# Patient Record
Sex: FEMALE | Race: WHITE | Employment: FULL TIME | URBAN - METROPOLITAN AREA
[De-identification: names, ages, dates, MRNs, and addresses within clinical notes are randomized per-mention and may not be internally consistent; named-entity substitution may affect disease eponyms.]

---

## 2017-07-18 ENCOUNTER — OFFICE VISIT (OUTPATIENT)
Dept: INTERNAL MEDICINE CLINIC | Age: 62
End: 2017-07-18

## 2017-07-18 VITALS
BODY MASS INDEX: 29.47 KG/M2 | WEIGHT: 199 LBS | SYSTOLIC BLOOD PRESSURE: 118 MMHG | HEART RATE: 63 BPM | HEIGHT: 69 IN | DIASTOLIC BLOOD PRESSURE: 88 MMHG | RESPIRATION RATE: 14 BRPM | TEMPERATURE: 98.6 F | OXYGEN SATURATION: 99 %

## 2017-07-18 DIAGNOSIS — G89.29 GROIN PAIN, CHRONIC, RIGHT: Primary | ICD-10-CM

## 2017-07-18 DIAGNOSIS — Z12.11 COLON CANCER SCREENING: ICD-10-CM

## 2017-07-18 DIAGNOSIS — R10.31 GROIN PAIN, CHRONIC, RIGHT: Primary | ICD-10-CM

## 2017-07-18 DIAGNOSIS — Z00.00 GENERAL MEDICAL EXAM: ICD-10-CM

## 2017-07-18 DIAGNOSIS — Z12.39 BREAST SCREENING: ICD-10-CM

## 2017-07-18 DIAGNOSIS — Z01.419 WOMEN'S ANNUAL ROUTINE GYNECOLOGICAL EXAMINATION: ICD-10-CM

## 2017-07-18 DIAGNOSIS — Z95.828 S/P IVC FILTER: ICD-10-CM

## 2017-07-18 PROBLEM — Z86.711 HISTORY OF PULMONARY EMBOLUS (PE): Status: ACTIVE | Noted: 2017-07-18

## 2017-07-18 RX ORDER — BISMUTH SUBSALICYLATE 262 MG
1 TABLET,CHEWABLE ORAL DAILY
COMMUNITY

## 2017-07-18 NOTE — PROGRESS NOTES
Chief Complaint   Patient presents with    Establish Care     possible hernia, lab work, IV      Subjective: This is a new patient. Ms. Carmita Walton is a 63year old white female, going to turn 63 later this year, who has no real active health problems. She only takes a multivitamin daily. She has been on a Nutri-System type diet since March and has lost 40 pounds and is feeling well. She does complain of some right groin pain, which has been bothering her on and off for two years. She said it initially happened when she moved a lot of heavy boxes. She had pain in that area. The pain has continued, although much less, and it only hurts now when she lifts something heavy or does a lot of heavy bending. 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 She lives alone, has spent a lot of time taking care of her elderly mother over the years. Has not had any good healthcare. In 2005 she broke an ankle and due to immobility got a DVT and PE. She had an IVC filter, took Heparin and Coumadin for six months to a year. This was all in [place]. At that time she was commuting to [place] for work. She has an IVC filter in, they never took it out. She has not had a mammogram in recent years, no Pap smears. Went through routine menopause around age 50. She's never had a colonoscopy. She's not had recent lab. She has no other complaints. Social History:  No tobacco, no alcohol. Not , not sexually active. ROS:  Positive for some kind of a seizure disorder years ago. Maybe someone told her her gallbladder was bothering her. She gets some allergy symptoms, occasionally gets problems controlling her bladder. Her cancer history by family is lung cancer. There is some diabetes in the family. There is some heart disease in the family and hypertension. Physical Examination:    GENERAL:  She is a pretty healthy appearing white female in NAD.   VITALS:  Normal.  Blood pressure was normal.  HEENT:  Normal.  CHEST:  Lungs clear. CV:  S1, S2 regular. No murmur. ABDOMEN:  Soft. :  No tenderness in the groin, no hernia palpated. She stood, when she coughed or bent she had no discomfort. MUSCULOSKELETAL:  ROM of both hips were normal.  SLR test negative. Impression:  1. Good general health. 2. Groin pain is musculoskeletal, not too serious, not too worried about it. Plan:  1. Patient will need GYN care and I have referred her to Dr. Susanna Carter. 2. Patient is advised to get a colonoscopy, see if she can get it done this summer. I have given her the name of Dr. Nellie Horton to schedule that. 3. I asked her to do complete lab work today and that is a good idea. Basically healthy. 4. Told her the IVC filters can be problematic, generally they are not taken out, an old one from 2005. It doesn't sound like she is having issues with it at this point. Patient Active Problem List    Diagnosis    S/P IVC filter     2005        History of pulmonary embolus (PE)     Vitals:    07/18/17 1037   BP: 118/88   Pulse: 63   Resp: 14   Temp: 98.6 °F (37 °C)   TempSrc: Oral   SpO2: 99%   Weight: 199 lb (90.3 kg)   Height: 5' 9\" (1.753 m)     Body mass index is 29.39 kg/(m^2). DOING VERY WELL IN GOOD HEALTH, ADVISE CONTINUED HEALTHY LIFESTYLE  CONTINUE SCREENING TESTING AS ADVISED  Bib Vance was seen today for establish care. Diagnoses and all orders for this visit:    General medical exam  -     HELLEN MAMMO BI SCREENING INCL CAD;  Future  -     CBC WITH AUTOMATED DIFF  -     LIPID PANEL  -     METABOLIC PANEL, COMPREHENSIVE    S/P IVC filter    Women's annual routine gynecological examination  -     REFERRAL TO OBSTETRICS AND GYNECOLOGY    Colon cancer screening  -     REFERRAL TO GASTROENTEROLOGY    Breast screening    Groin pain, chronic, right

## 2017-07-19 LAB
ALBUMIN SERPL-MCNC: 4.2 G/DL (ref 3.5–5.5)
ALBUMIN/GLOB SERPL: 1.6 {RATIO} (ref 1.2–2.2)
ALP SERPL-CCNC: 92 IU/L (ref 39–117)
ALT SERPL-CCNC: 14 IU/L (ref 0–32)
AST SERPL-CCNC: 20 IU/L (ref 0–40)
BASOPHILS # BLD AUTO: 0 X10E3/UL (ref 0–0.2)
BASOPHILS NFR BLD AUTO: 1 %
BILIRUB SERPL-MCNC: 0.6 MG/DL (ref 0–1.2)
BUN SERPL-MCNC: 20 MG/DL (ref 6–24)
BUN/CREAT SERPL: 28 (ref 9–23)
CALCIUM SERPL-MCNC: 9.5 MG/DL (ref 8.7–10.2)
CHLORIDE SERPL-SCNC: 101 MMOL/L (ref 96–106)
CHOLEST SERPL-MCNC: 166 MG/DL (ref 100–199)
CO2 SERPL-SCNC: 24 MMOL/L (ref 18–29)
CREAT SERPL-MCNC: 0.72 MG/DL (ref 0.57–1)
EOSINOPHIL # BLD AUTO: 0.4 X10E3/UL (ref 0–0.4)
EOSINOPHIL NFR BLD AUTO: 7 %
ERYTHROCYTE [DISTWIDTH] IN BLOOD BY AUTOMATED COUNT: 13.9 % (ref 12.3–15.4)
GLOBULIN SER CALC-MCNC: 2.6 G/DL (ref 1.5–4.5)
GLUCOSE SERPL-MCNC: 88 MG/DL (ref 65–99)
HCT VFR BLD AUTO: 41.9 % (ref 34–46.6)
HDLC SERPL-MCNC: 44 MG/DL
HGB BLD-MCNC: 13.8 G/DL (ref 11.1–15.9)
IMM GRANULOCYTES # BLD: 0 X10E3/UL (ref 0–0.1)
IMM GRANULOCYTES NFR BLD: 0 %
INTERPRETATION, 910389: NORMAL
LDLC SERPL CALC-MCNC: 107 MG/DL (ref 0–99)
LYMPHOCYTES # BLD AUTO: 1.4 X10E3/UL (ref 0.7–3.1)
LYMPHOCYTES NFR BLD AUTO: 25 %
MCH RBC QN AUTO: 30.5 PG (ref 26.6–33)
MCHC RBC AUTO-ENTMCNC: 32.9 G/DL (ref 31.5–35.7)
MCV RBC AUTO: 93 FL (ref 79–97)
MONOCYTES # BLD AUTO: 0.4 X10E3/UL (ref 0.1–0.9)
MONOCYTES NFR BLD AUTO: 6 %
NEUTROPHILS # BLD AUTO: 3.4 X10E3/UL (ref 1.4–7)
NEUTROPHILS NFR BLD AUTO: 61 %
PLATELET # BLD AUTO: 173 X10E3/UL (ref 150–379)
POTASSIUM SERPL-SCNC: 4.3 MMOL/L (ref 3.5–5.2)
PROT SERPL-MCNC: 6.8 G/DL (ref 6–8.5)
RBC # BLD AUTO: 4.53 X10E6/UL (ref 3.77–5.28)
SODIUM SERPL-SCNC: 144 MMOL/L (ref 134–144)
TRIGL SERPL-MCNC: 77 MG/DL (ref 0–149)
VLDLC SERPL CALC-MCNC: 15 MG/DL (ref 5–40)
WBC # BLD AUTO: 5.5 X10E3/UL (ref 3.4–10.8)

## 2017-07-21 ENCOUNTER — HOSPITAL ENCOUNTER (OUTPATIENT)
Dept: MAMMOGRAPHY | Age: 62
Discharge: HOME | End: 2017-07-21
Attending: INTERNAL MEDICINE
Payer: COMMERCIAL

## 2017-07-21 DIAGNOSIS — Z00.00 GENERAL MEDICAL EXAM: ICD-10-CM

## 2017-07-21 PROCEDURE — 77067 SCR MAMMO BI INCL CAD: CPT

## 2017-07-26 ENCOUNTER — HOSPITAL ENCOUNTER (OUTPATIENT)
Dept: ULTRASOUND IMAGING | Age: 62
Discharge: HOME | End: 2017-07-26
Attending: INTERNAL MEDICINE
Payer: COMMERCIAL

## 2017-07-26 ENCOUNTER — HOSPITAL ENCOUNTER (OUTPATIENT)
Dept: MAMMOGRAPHY | Age: 62
Discharge: HOME | End: 2017-07-26
Attending: INTERNAL MEDICINE
Payer: COMMERCIAL

## 2017-07-26 DIAGNOSIS — R92.8 ABNORMAL MAMMOGRAM: ICD-10-CM

## 2017-07-26 DIAGNOSIS — R92.8 ABNORMAL MAMMOGRAM: Primary | ICD-10-CM

## 2017-07-26 PROCEDURE — 76642 ULTRASOUND BREAST LIMITED: CPT

## 2017-07-27 ENCOUNTER — OFFICE VISIT (OUTPATIENT)
Dept: OBGYN CLINIC | Age: 62
End: 2017-07-27

## 2017-07-27 VITALS
BODY MASS INDEX: 29.18 KG/M2 | HEART RATE: 73 BPM | WEIGHT: 197 LBS | HEIGHT: 69 IN | SYSTOLIC BLOOD PRESSURE: 115 MMHG | DIASTOLIC BLOOD PRESSURE: 75 MMHG

## 2017-07-27 DIAGNOSIS — Z91.89 DES EXPOSURE IN UTERO: ICD-10-CM

## 2017-07-27 DIAGNOSIS — Z01.419 ENCOUNTER FOR WELL WOMAN EXAM WITH ROUTINE GYNECOLOGICAL EXAM: Primary | ICD-10-CM

## 2017-07-27 DIAGNOSIS — Z86.711 HISTORY OF PULMONARY EMBOLUS (PE): ICD-10-CM

## 2017-07-27 NOTE — PATIENT INSTRUCTIONS
Well Visit, Women 48 to 72: Care Instructions  Your Care Instructions  Physical exams can help you stay healthy. Your doctor has checked your overall health and may have suggested ways to take good care of yourself. He or she also may have recommended tests. At home, you can help prevent illness with healthy eating, regular exercise, and other steps. Follow-up care is a key part of your treatment and safety. Be sure to make and go to all appointments, and call your doctor if you are having problems. It's also a good idea to know your test results and keep a list of the medicines you take. How can you care for yourself at home? · Reach and stay at a healthy weight. This will lower your risk for many problems, such as obesity, diabetes, heart disease, and high blood pressure. · Get at least 30 minutes of exercise on most days of the week. Walking is a good choice. You also may want to do other activities, such as running, swimming, cycling, or playing tennis or team sports. · Do not smoke. Smoking can make health problems worse. If you need help quitting, talk to your doctor about stop-smoking programs and medicines. These can increase your chances of quitting for good. · Protect your skin from too much sun. When you're outdoors from 10 a.m. to 4 p.m., stay in the shade or cover up with clothing and a hat with a wide brim. Wear sunglasses that block UV rays. Even when it's cloudy, put broad-spectrum sunscreen (SPF 30 or higher) on any exposed skin. · See a dentist one or two times a year for checkups and to have your teeth cleaned. · Wear a seat belt in the car. · Limit alcohol to 1 drink a day. Too much alcohol can cause health problems. Follow your doctor's advice about when to have certain tests. These tests can spot problems early. · Cholesterol.  Your doctor will tell you how often to have this done based on your age, family history, or other things that can increase your risk for heart attack and stroke. · Blood pressure. Have your blood pressure checked during a routine doctor visit. Your doctor will tell you how often to check your blood pressure based on your age, your blood pressure results, and other factors. · Mammogram. Ask your doctor how often you should have a mammogram, which is an X-ray of your breasts. A mammogram can spot breast cancer before it can be felt and when it is easiest to treat. · Pap test and pelvic exam. Ask your doctor how often you should have a Pap test. You may not need to have a Pap test as often as you used to. · Vision. Have your eyes checked every year or two or as often as your doctor suggests. Some experts recommend that you have yearly exams for glaucoma and other age-related eye problems starting at age 48. · Hearing. Tell your doctor if you notice any change in your hearing. You can have tests to find out how well you hear. · Diabetes. Ask your doctor whether you should have tests for diabetes. · Colon cancer. You should begin tests for colon cancer at age 48. You may have one of several tests. Your doctor will tell you how often to have tests based on your age and risk. Risks include whether you already had a precancerous polyp removed from your colon or whether your parents, sisters and brothers, or children have had colon cancer. · Thyroid disease. Talk to your doctor about whether to have your thyroid checked as part of a regular physical exam. Women have an increased chance of a thyroid problem. · Osteoporosis. You should begin tests for bone density at age 72. If you are younger than 72, ask your doctor whether you have factors that may increase your risk for this disease. You may want to have this test before age 72. · Heart attack and stroke risk. At least every 4 to 6 years, you should have your risk for heart attack and stroke assessed.  Your doctor uses factors such as your age, blood pressure, cholesterol, and whether you smoke or have diabetes to show what your risk for a heart attack or stroke is over the next 10 years. When should you call for help? Watch closely for changes in your health, and be sure to contact your doctor if you have any problems or symptoms that concern you. Where can you learn more? Go to http://sachin-brenda.info/. Enter Z518 in the search box to learn more about \"Well Visit, Women 50 to 72: Care Instructions. \"  Current as of: July 19, 2016  Content Version: 11.3  © 9736-8141 Healthwise, Incorporated. Care instructions adapted under license by "[employer] (which disclaims liability or warranty for this information). If you have questions about a medical condition or this instruction, always ask your healthcare professional. [address] any warranty or liability for your use of this information.

## 2017-07-27 NOTE — PROGRESS NOTES
Annual exam ages 40-58   (New Patient)     Erin Baldwin is a ,  63 y.o. female [facility] No LMP recorded. Patient is postmenopausal., who presents for her annual checkup. Since her last annual GYN exam about three or more years ago,  she has the following changes in her health history:  - abnl mammogram, has bx scheduled  at [facility]  - has colonoscopy scheduled for     ADDITIONAL CONCERNS:  She is having no significant problems. With regard to the Gardasil vaccine, she is older than the age for which it is FDA approved. Menstrual status:    Has gone through menopause. Contraception:    The current method of family planning is abstinence and post menopausal status. Sexual history:     reports that she does not currently engage in sexual activity but has had male partners.;        Pap and Mammogram History:    Her most recent Pap smear was normal per pt., obtained year(s) ago, unknown date. The patient had a recent mammogram on 17 -> Add't views -> 17 US recomm bx -> Appt set for 17. [facility]: Paternal Aunt + Br CA in her 45s. Osteoporosis History:    Family history does not include a first or second degree relative with osteopenia or osteoporosis. 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 Has IVC filter.  Ovarian cyst     PE (pulmonary thromboembolism) ([address]     after left ankle fracture. Has IVC filter.  Routine Papanicolaou smear     Unknown date - negative per pt - an attempt was made in      Past Surgical History:   Procedure Laterality Date    HX APPENDECTOMY      incidental at time of laparotomy for ovarian cystectomy.  HX CYSTECTOMY Right 1969    x2. Via laparotomy. Incidental appendectomy.      OB History   Atul Para Term  AB Living   0 0 0 0 0 0   SAB TAB Ectopic Molar Multiple Live Births   0 0 0 0 0 0             Current Outpatient Prescriptions   Medication Sig Dispense Refill    multivitamin (ONE A DAY) tablet Take 1 Tab by mouth daily. Allergies: Review of patient's allergies indicates no known allergies. Social History     Social History    Marital status: SINGLE     Spouse name: N/A    Number of children: N/A    Years of education: N/A     Occupational History    Not on file. Social History Main Topics    Smoking status: Never Smoker    Smokeless tobacco: Never Used      Comment: Never used vapor or e-cigs     Alcohol use No    Drug use: No    Sexual activity: Not Currently     Partners: Male     Other Topics Concern    Not on file     Social History Narrative     Tobacco History:  reports that she has never smoked. She has never used smokeless tobacco.  Alcohol Abuse:  reports that she does not drink alcohol. Drug Abuse:  reports that she does not use illicit drugs. 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  at 81yo.     Other Paternal Aunt      multiple myeloma    Stomach Cancer Paternal Aunt 77       Review of Systems - History obtained from the patient  Constitutional: negative for weight loss, fever, night sweats  HEENT: negative for hearing loss, earache, congestion, snoring, sorethroat  CV: negative for chest pain, palpitations, edema  Resp: negative for cough, shortness of breath, wheezing  GI: negative for change in bowel habits, abdominal pain, black or bloody stools  : negative for frequency, dysuria, hematuria, vaginal discharge  MSK: negative for back pain, joint pain, muscle pain  Breast: negative for breast lumps, nipple discharge, galactorrhea  Skin :negative for itching, rash, hives  Neuro: negative for dizziness, headache, confusion, weakness  Psych: negative for anxiety, depression, change in mood  Heme/lymph: negative for bleeding, bruising, pallor    Physical Exam    Visit Vitals    /75    Pulse 73    Ht 5' 9\" (1.753 m)    Wt 197 lb (89.4 kg)    BMI 29.09 kg/m2       Constitutional  · Appearance: well-nourished, well developed, alert, in no acute distress    HENT  · Head and Face: appears normal    Neck  · Inspection/Palpation: normal appearance, no masses or tenderness  · Lymph Nodes: no lymphadenopathy present  · Thyroid: gland size normal, nontender, no nodules or masses present on palpation    Chest  · Respiratory Effort: breathing unlabored  · Auscultation: normal breath sounds    Cardiovascular  · Heart:  · Auscultation: regular rate and rhythm without murmur    Breasts  · Inspection of Breasts: breasts symmetrical, no skin changes, no discharge present, nipple appearance normal, no skin retraction present  · Palpation of Breasts and Axillae: no masses present on palpation, no breast tenderness  · Axillary Lymph Nodes: no lymphadenopathy present    Gastrointestinal  · Abdominal Examination: abdomen non-tender to palpation, normal bowel sounds, no masses present  · Liver and spleen: no hepatomegaly present, spleen not palpable  · Hernias: no hernias identified    Genitourinary  · External Genitalia: normal appearance for age, no discharge present, no tenderness present, no inflammatory lesions present, no masses present, signifcant atrophy present  · Vagina: normal vaginal vault without central or paravaginal defects, no discharge present, no inflammatory lesions present, no masses present; atrophic  · Bladder: non-tender to palpation  · Urethra: appears normal  · Cervix: normal   · Uterus: normal size, shape and consistency  · Adnexa: no adnexal tenderness present, no adnexal masses present  · Perineum: perineum within normal limits, no evidence of trauma, no rashes or skin lesions present  · Anus: anus within normal limits, no hemorrhoids present  · Inguinal Lymph Nodes: no lymphadenopathy present  RV:  Deferred - has colonoscopy scheduled for Monday 7/31/17    Skin  · General Inspection: no rash, no lesions identified    Neurologic/Psychiatric  · Mental Status:  · Orientation: grossly oriented to person, place and time  · Mood and Affect: mood normal, affect appropriate        Assessment & Plan:  · Routine gynecologic examination. Pap/HPV done (with Lloyd speculum)  · JAMES exposure. Pap as above. Will need yearly pap; with HPV q 5yrs until 78 yo, then q3yr paps. · FHx of variety of cancers. May be candidate for genetic screening. Pamphlets given. · Her current medical status is satisfactory with no evidence of significant gynecologic issues. · Counseled re: diet, exercise, healthy lifestyle  · Return for yearly wellness visits  · Abnl mammogram -> has bx scheduled for 8/8  · Has colonoscopy scheduled for Monday 7/31. · H/o DVT/PE. Has IVC filter. · Patient verbalized understanding           Orders Placed This Encounter    PAP IG, HPV AND RFX HPV 97/44,94([id number])     Scheduling Instructions:      Hx of DEC in utero     Order Specific Question:   Pap Source? Answer:   Cervical and Endocervical     Order Specific Question:   Total Hysterectomy? Answer:   No     Order Specific Question:   Supracervical Hysterectomy? Answer:   No     Order Specific Question:   Post Menopausal?     Answer:   Yes     Order Specific Question:   Hormone Therapy? Answer:   No     Order Specific Question:   IUD? Answer:   No     Order Specific Question:   Abnormal Bleeding?      Answer:   No     Order Specific Question: Pregnant     Answer:   No     Order Specific Question:   Post Partum? Answer:   No     Order Specific Question:   Other patient information related to this order?      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

## 2017-07-29 LAB
CYTOLOGIST CVX/VAG CYTO: NORMAL
CYTOLOGY CVX/VAG DOC THIN PREP: NORMAL
DX ICD CODE: NORMAL
HPV I/H RISK 1 DNA CVX QL PROBE+SIG AMP: NEGATIVE
Lab: NORMAL
OTHER STN SPEC: NORMAL
PATH REPORT.FINAL DX SPEC: NORMAL
STAT OF ADQ CVX/VAG CYTO-IMP: NORMAL

## 2017-08-08 ENCOUNTER — HOSPITAL ENCOUNTER (OUTPATIENT)
Dept: MAMMOGRAPHY | Age: 62
Discharge: HOME | End: 2017-08-08
Attending: INTERNAL MEDICINE
Payer: COMMERCIAL

## 2017-08-08 DIAGNOSIS — N63.20 BREAST MASS, LEFT: ICD-10-CM

## 2017-08-08 PROCEDURE — 74011000250 HC RX REV CODE- 250: Performed by: RADIOLOGY

## 2017-08-08 PROCEDURE — 77065 DX MAMMO INCL CAD UNI: CPT

## 2017-08-08 PROCEDURE — A4648 IMPLANTABLE TISSUE MARKER: HCPCS

## 2017-08-08 RX ORDER — LIDOCAINE HYDROCHLORIDE AND EPINEPHRINE 10; 10 MG/ML; UG/ML
8 INJECTION, SOLUTION INFILTRATION; PERINEURAL ONCE
Status: COMPLETED | OUTPATIENT
Start: 2017-08-08 | End: 2017-08-08

## 2017-08-08 RX ORDER — SODIUM BICARBONATE 84 MG/ML
5 INJECTION, SOLUTION INTRAVENOUS
Status: COMPLETED | OUTPATIENT
Start: 2017-08-08 | End: 2017-08-08

## 2017-08-08 RX ORDER — LIDOCAINE HYDROCHLORIDE 10 MG/ML
12 INJECTION INFILTRATION; PERINEURAL
Status: COMPLETED | OUTPATIENT
Start: 2017-08-08 | End: 2017-08-08

## 2017-08-08 RX ADMIN — SODIUM BICARBONATE 5 MEQ: 84 INJECTION, SOLUTION INTRAVENOUS at 09:36

## 2017-08-08 RX ADMIN — LIDOCAINE HYDROCHLORIDE 12 ML: 10 INJECTION, SOLUTION INFILTRATION; PERINEURAL at 09:36

## 2017-08-08 RX ADMIN — LIDOCAINE HYDROCHLORIDE AND EPINEPHRINE 80 MG: 10; 10 INJECTION, SOLUTION INFILTRATION; PERINEURAL at 09:36

## 2017-08-08 NOTE — PROGRESS NOTES
Pressure held to biopsy site. Minimal to no bleeding noted. No hematoma noted. Patient tolerated procedure well. Post biopsy discharge instructions reviewed with patient and patient given a copy. Dressing remained dry and intact post breast clip mammogram.  Ice pack applied over transparent dressing. Patient discharged with instructions and ice packs.

## 2017-08-08 NOTE — PROGRESS NOTES
Patient received for left breast biopsy for mass seen on ultrasound. Procedure explained to patient as well as risks associated with procedure. Post biopsy discharge instructions reviewed with patient. Patient is planning a trip tomorrow and prefers to be contacted by phone with pathology results.

## 2017-08-09 NOTE — PROGRESS NOTES
Pathology results in and concurred by Dr Yovani Stafford and faxed to Dr Alysha Parks. Patient contacted with results.

## 2017-09-24 ENCOUNTER — PATIENT MESSAGE (OUTPATIENT)
Dept: INTERNAL MEDICINE CLINIC | Age: 62
End: 2017-09-24

## 2018-05-08 ENCOUNTER — OFFICE VISIT (OUTPATIENT)
Dept: INTERNAL MEDICINE CLINIC | Age: 63
End: 2018-05-08

## 2018-05-08 VITALS
HEIGHT: 69 IN | TEMPERATURE: 98.6 F | SYSTOLIC BLOOD PRESSURE: 125 MMHG | BODY MASS INDEX: 24.14 KG/M2 | WEIGHT: 163 LBS | RESPIRATION RATE: 14 BRPM | DIASTOLIC BLOOD PRESSURE: 91 MMHG | OXYGEN SATURATION: 99 % | HEART RATE: 81 BPM

## 2018-05-08 DIAGNOSIS — R10.31 GROIN PAIN, RIGHT: Primary | ICD-10-CM

## 2018-05-08 DIAGNOSIS — L60.0 INGROWN TOENAIL: ICD-10-CM

## 2018-05-08 NOTE — MR AVS SNAPSHOT
[address] [place] [address] [address] [facility] 
[phone] Patient: Natasha Frank MRN: [id number] :1955 Visit Information Date & Time Provider Department Dept. Phone Encounter #  
 2018  4:15 PM Valeria Sultana, [address] [place] [address] [facility] [phone] [id number] Upcoming Health Maintenance Date Due Hepatitis C Screening 1955 DTaP/Tdap/Td series (1 - Tdap) 1978 FOBT Q 1 YEAR AGE 50-75 2007 ZOSTER VACCINE AGE 60> 10/11/2017 Influenza Age 5 to Adult 2018 BREAST CANCER SCRN MAMMOGRAM 2019 PAP AKA CERVICAL CYTOLOGY 2022 Allergies as of 2018  Review Complete On: 2018 By: Kaz Quick LPN No Known Allergies Current Immunizations  Never Reviewed No immunizations on file. Not reviewed this visit You Were Diagnosed With   
  
 Codes Comments Ingrown toenail    -  Primary ICD-10-CM: L60.0 ICD-9-CM: 703.0 Vitals BP Pulse Temp Resp Height(growth percentile) Weight(growth percentile) (!) 125/91 (BP 1 Location: Left arm, BP Patient Position: Sitting) 81 98.6 °F (37 °C) (Oral) 14 5' 9\" (1.753 m) 163 lb (73.9 kg) SpO2 BMI OB Status Smoking Status 99% 24.07 kg/m2 Postmenopausal Never Smoker Vitals History BMI and BSA Data Body Mass Index Body Surface Area 24.07 kg/m 2 1.9 m 2 Preferred Pharmacy Pharmacy Name Phone [facility] [address] [phone] Your Updated Medication List  
  
   
This list is accurate as of 18  4:59 PM.  Always use your most recent med list.  
  
  
  
  
 multivitamin tablet Commonly known as:  ONE A DAY Take 1 Tab by mouth daily. We Performed the Following REFERRAL TO PODIATRY [REF90 Custom] Referral Information Referral ID Referred By Referred To [id number] [facility]   
   [address], [address] Visits Status Start Date End Date 1 New Request 5/8/18 5/8/19 If your referral has a status of pending review or denied, additional information will be sent to support the outcome of this decision. Introducing [facility]! Dear Larissa Aguilar: Thank you for requesting a [facility] account. Our records indicate that you already have an active [facility] account. You can access your account anytime at [email or web address] Did you know that you can access your hospital and ER discharge instructions at any time in [facility]? You can also review all of your test results from your hospital stay or ER visit. Additional Information If you have questions, please visit the Frequently Asked Questions section of the [facility] website at [email or web address] Remember, [facility] is NOT to be used for urgent needs. For medical emergencies, dial 911. Now available from your iPhone and Android! Please provide this summary of care documentation to your next provider. Your primary care clinician is listed as Philippe Segal. If you have any questions after today's visit, please call [phone].

## 2018-05-08 NOTE — PROGRESS NOTES
Subjective:  Healthy 63year old. She's lost 90 lbs with a diet over 18 months. Is trying to maintain it. She has been doing aerobics the whole time now, is working with a  and doing weights. She's doing crunches, leg lifts. She has been having a little bit of left scapular pain for several months. She's had right groin pain for a couple weeks. Physical Examination:  Shows slight tenderness of the left scapula, but nothing serious. Respiratory normal.  Right groin - no hernia. ROM of hip was normal.  Tenderness with abduction. Impression/Plan:  Groin pain. Would rest it, avoid doing lifts and heavy leg lifts, and the scapular pain is benign. She can continue to work with that. 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 Then pap q 3yrs. Cytology specimens should be obtained from the cervix (brush for endocervical canal, spatula for transformation zone) and four quadrants (circumference) of the upper one-third of the vagina (spatula).  S/P IVC filter     2005        History of pulmonary embolus (PE)       Vitals:    05/08/18 1629   BP: (!) 125/91   Pulse: 81   Resp: 14   Temp: 98.6 °F (37 °C)   TempSrc: Oral   SpO2: 99%   Weight: 163 lb (73.9 kg)   Height: 5' 9\" (1.753 m)     1.  Ingrown toenail  Podiatry soaks  - REFERRAL TO PODIATRY    2. Groin pain, right  Benign rest are advised

## 2018-05-08 NOTE — PROGRESS NOTES
Chief Complaint   Patient presents with    Groin Pain    Shoulder Pain     left shoulder blade     Has lost  90 pounds

## 2018-05-08 NOTE — PROGRESS NOTES
Coordination of Care Questions    1. Have you been to the ER, urgent care clinic since your last visit? No       Hospitalized since your last visit? No    2. Have you seen or consulted any other health care providers outside of the [address] [place] [address] since your last visit? Include any pap smears or colon screening.  No

## 2018-07-26 ENCOUNTER — OFFICE VISIT (OUTPATIENT)
Dept: INTERNAL MEDICINE CLINIC | Age: 63
End: 2018-07-26

## 2018-07-26 VITALS
SYSTOLIC BLOOD PRESSURE: 121 MMHG | OXYGEN SATURATION: 98 % | RESPIRATION RATE: 16 BRPM | TEMPERATURE: 96.6 F | BODY MASS INDEX: 24.14 KG/M2 | HEART RATE: 71 BPM | HEIGHT: 69 IN | WEIGHT: 163 LBS | DIASTOLIC BLOOD PRESSURE: 85 MMHG

## 2018-07-26 DIAGNOSIS — Z00.8 ENCOUNTER FOR BIOMETRIC SCREENING: Primary | ICD-10-CM

## 2018-07-26 DIAGNOSIS — Z11.59 ENCOUNTER FOR HEPATITIS C SCREENING TEST FOR LOW RISK PATIENT: ICD-10-CM

## 2018-07-26 DIAGNOSIS — Z78.0 MENOPAUSE: ICD-10-CM

## 2018-07-26 NOTE — MR AVS SNAPSHOT
[address] [place] [address] [address] [place] [address] 
[phone] Patient: Maliha Caputo MRN: [id number] :1955 Visit Information Date & Time Provider Department Dept. Phone Encounter #  
 2018  8:30 AM Susan Manuel MD [facility] [phone] [id number] Upcoming Health Maintenance Date Due Hepatitis C Screening 1955 DTaP/Tdap/Td series (1 - Tdap) 1978 ZOSTER VACCINE AGE 60> 10/11/2017 Influenza Age 5 to Adult 2018 BREAST CANCER SCRN MAMMOGRAM 2019 PAP AKA CERVICAL CYTOLOGY 2022 COLONOSCOPY 2027 Allergies as of 2018  Review Complete On: 2018 By: Flaco Adair LPN No Known Allergies Current Immunizations  Never Reviewed No immunizations on file. Not reviewed this visit You Were Diagnosed With   
  
 Codes Comments Encounter for biometric screening    -  Primary ICD-10-CM: Z01.89 ICD-9-CM: V72.85 Menopause     ICD-10-CM: Z78.0 ICD-9-CM: 627.2 Encounter for hepatitis C screening test for low risk patient     ICD-10-CM: Z11.59 
ICD-9-CM: V73.89 Vitals BP Pulse Temp Resp Height(growth percentile) Weight(growth percentile) 121/85 (BP 1 Location: Right arm, BP Patient Position: Sitting) 71 96.6 °F (35.9 °C) (Oral) 16 5' 9\" (1.753 m) 163 lb (73.9 kg) SpO2 BMI OB Status Smoking Status 98% 24.07 kg/m2 Postmenopausal Never Smoker Vitals History BMI and BSA Data Body Mass Index Body Surface Area 24.07 kg/m 2 1.9 m 2 Preferred Pharmacy Pharmacy Name Phone [facility] [address] [phone] Your Updated Medication List  
  
   
This list is accurate as of 18  8:54 AM.  Always use your most recent med list.  
  
  
  
  
 multivitamin tablet Commonly known as:  ONE A DAY  
 Take 1 Tab by mouth daily. varicella-zoster recombinant (PF) 50 mcg/0.5 mL Susr injection Commonly known as:  SHINGRIX (PF)  
0.5 mL by IntraMUSCular route once for 1 dose. Indications: PREVENTION OF HERPES ZOSTER Prescriptions Printed Refills  
 varicella-zoster recombinant, PF, (SHINGRIX, PF,) 50 mcg/0.5 mL susr injection 1 Si.5 mL by IntraMUSCular route once for 1 dose. Indications: PREVENTION OF HERPES ZOSTER Class: Print Route: IntraMUSCular We Performed the Following HEPATITIS C AB [00668 CPT(R)] LIPID PANEL [80844 CPT(R)] METABOLIC PANEL, COMPREHENSIVE [42788 CPT(R)] To-Do List   
 2018 Imaging:  DEXA BONE DENSITY STUDY AXIAL   
  
 2018 9:30 AM  
  Appointment with [facility] at [facility] ([phone]) Shower or bathe using soap and water. Do not use deodorant, powder, perfumes, or lotion the day of your exam.  If your prior mammograms were not performed at [facility] please bring films with you or forward prior images 2 days before your procedure. Check in at registration 15min before your appointment time unless you were instructed to do otherwise. A script is not necessary, but if you have one, please bring it on the day of the mammogram or have it faxed to the department. You are responsible for finding a method of transportation to your appointment. If you don't have transportation, please reschedule your appointment at least 24 hours in advance. [facility] [phone] [facility] [place]  [phone] [facility] [address]  [phone] [facility] [phone] [facility] 0474 [facility] [phone] [facility]! Dear Christo Saab: Thank you for requesting a [employer] account. Our records indicate that you already have an active [employer] account. You can access your account anytime at [email or web address] Did you know that you can access your hospital and ER discharge instructions at any time in [employer]? You can also review all of your test results from your hospital stay or ER visit. Additional Information If you have questions, please visit the Frequently Asked Questions section of the [employer] website at [email or web address] Remember, [employer] is NOT to be used for urgent needs. For medical emergencies, dial 911. Now available from your iPhone and Android! Please provide this summary of care documentation to your next provider. Your primary care clinician is listed as Merced Cummings. If you have any questions after today's visit, please call [phone].

## 2018-07-26 NOTE — PROGRESS NOTES
Health Maintenance Due   Topic Date Due    Hepatitis C Screening  1955    DTaP/Tdap/Td series (1 - Tdap) 12/11/1978    FOBT Q 1 YEAR AGE 50-75  12/11/2007    ZOSTER VACCINE AGE 60>  10/11/2017       Chief Complaint   Patient presents with    Groin Pain       1. Have you been to the ER, urgent care clinic since your last visit? Hospitalized since your last visit? No    2. Have you seen or consulted any other health care providers outside of the [address] [place] [address] since your last visit? Include any pap smears or colon screening. No    3) Do you have an Advance Directive on file? no    4) Are you interested in receiving information on Advance Directives? NO      Patient is accompanied by self I have received verbal consent from Jaron Adame to discuss any/all medical information while they are present in the room.   \

## 2018-07-26 NOTE — PROGRESS NOTES
Maliha Caputo is here for complete health maintenance physical exam and screening. she does have other concerns. Groin chronic   Health maintenance hx includes:  Exercise: moderately active. 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   Osteoporosis screening:  Last BMD:  Never  nd revealed -     Lab Results   Component Value Date/Time    Cholesterol, total 166 07/18/2017 11:36 AM    HDL Cholesterol 44 07/18/2017 11:36 AM    LDL, calculated 107 (H) 07/18/2017 11:36 AM    VLDL, calculated 15 07/18/2017 11:36 AM    Triglyceride 77 07/18/2017 11:36 AM         Lab Results   Component Value Date/Time    Glucose 88 07/18/2017 11:36 AM       Immunizations: There is no immunization history on file for this patient. Immunization status: up to date and documented. Social History     Social History    Marital status: SINGLE     Spouse name: N/A    Number of children: N/A    Years of education: N/A     Occupational History    Not on file. Social History Main Topics    Smoking status: Never Smoker    Smokeless tobacco: Never Used      Comment: Never used vapor or e-cigs     Alcohol use No    Drug use: No    Sexual activity: Not Currently     Partners: Male     Other Topics Concern    Not on file     Social History Narrative     Past Surgical History:   Procedure Laterality Date    HX APPENDECTOMY  1969    incidental at time of laparotomy for ovarian cystectomy.  HX CYSTECTOMY Right 1969    x2. Via laparotomy. Incidental appendectomy.      Family History   Problem Relation Age of Onset    Dementia Mother     Heart Disease Mother     GERD Mother     Lung Disease Father     Cancer Father      Lung     Alcohol abuse Father     Breast Cancer Paternal Aunt 39     Passed from [address] [facility] Uterine Cancer Paternal Grandmother 80     survived.  at 81yo.  Other Paternal Aunt      multiple myeloma    Stomach Cancer Paternal Aunt 77     Current Outpatient Prescriptions on File Prior to Visit   Medication Sig Dispense Refill    multivitamin (ONE A DAY) tablet Take 1 Tab by mouth daily. No current facility-administered medications on file prior to visit. .  Vitals:    18 0830   BP: 121/85   Pulse: 71   Resp: 16   Temp: 96.6 °F (35.9 °C)   TempSrc: Oral   SpO2: 98%   Weight: 163 lb (73.9 kg)   Height: 5' 9\" (1.753 m)     The physical exam is generally normal. Patient appears well, alert and oriented x 3, pleasant, cooperative. Vitals are as noted. Neck supple and free of adenopathy, or masses. No thyromegaly. S1 and S2 normal, no murmurs, clicks, gallops or rubs. Regular rate and rhythm. Chest is clear; no wheezes or rales. No edema or JVD. The abdomen is soft without tenderness, guarding, mass, rebound or organomegaly. Bowel sounds are normal. No CVA tenderness or inguinal adenopathy noted. Diagnoses and all orders for this visit:    Encounter for biometric screening  -     METABOLIC PANEL, COMPREHENSIVE  -     LIPID PANEL    Menopause  -     DEXA BONE DENSITY STUDY AXIAL; Future    Encounter for hepatitis C screening test for low risk patient  -     HEPATITIS C AB    Other orders  -     varicella-zoster recombinant, PF, (SHINGRIX, PF,) 50 mcg/0.5 mL susr injection; 0.5 mL by IntraMUSCular route once for 1 dose.  Indications: PREVENTION OF HERPES ZOSTER, Print, Disp-0.5 mL, R-1

## 2018-07-27 LAB
ALBUMIN SERPL-MCNC: 4.3 G/DL (ref 3.6–4.8)
ALBUMIN/GLOB SERPL: 2 {RATIO} (ref 1.2–2.2)
ALP SERPL-CCNC: 92 IU/L (ref 39–117)
ALT SERPL-CCNC: 16 IU/L (ref 0–32)
AST SERPL-CCNC: 17 IU/L (ref 0–40)
BILIRUB SERPL-MCNC: 0.5 MG/DL (ref 0–1.2)
BUN SERPL-MCNC: 21 MG/DL (ref 8–27)
BUN/CREAT SERPL: 27 (ref 12–28)
CALCIUM SERPL-MCNC: 9 MG/DL (ref 8.7–10.3)
CHLORIDE SERPL-SCNC: 106 MMOL/L (ref 96–106)
CHOLEST SERPL-MCNC: 155 MG/DL (ref 100–199)
CO2 SERPL-SCNC: 25 MMOL/L (ref 20–29)
CREAT SERPL-MCNC: 0.78 MG/DL (ref 0.57–1)
GLOBULIN SER CALC-MCNC: 2.2 G/DL (ref 1.5–4.5)
GLUCOSE SERPL-MCNC: 77 MG/DL (ref 65–99)
HCV AB S/CO SERPL IA: <0.1 S/CO RATIO (ref 0–0.9)
HDLC SERPL-MCNC: 56 MG/DL
INTERPRETATION, 910389: NORMAL
LDLC SERPL CALC-MCNC: 89 MG/DL (ref 0–99)
POTASSIUM SERPL-SCNC: 4.2 MMOL/L (ref 3.5–5.2)
PROT SERPL-MCNC: 6.5 G/DL (ref 6–8.5)
SODIUM SERPL-SCNC: 145 MMOL/L (ref 134–144)
TRIGL SERPL-MCNC: 51 MG/DL (ref 0–149)
VLDLC SERPL CALC-MCNC: 10 MG/DL (ref 5–40)

## 2018-08-17 ENCOUNTER — HOSPITAL ENCOUNTER (OUTPATIENT)
Dept: MAMMOGRAPHY | Age: 63
Discharge: HOME | End: 2018-08-17
Attending: INTERNAL MEDICINE
Payer: COMMERCIAL

## 2018-08-17 DIAGNOSIS — Z12.39 SCREENING BREAST EXAMINATION: ICD-10-CM

## 2018-08-17 DIAGNOSIS — Z78.0 MENOPAUSE: ICD-10-CM

## 2018-08-17 PROCEDURE — 77063 BREAST TOMOSYNTHESIS BI: CPT

## 2018-08-17 PROCEDURE — 77080 DXA BONE DENSITY AXIAL: CPT

## 2018-08-29 ENCOUNTER — HOSPITAL ENCOUNTER (OUTPATIENT)
Dept: MAMMOGRAPHY | Age: 63
Discharge: HOME | End: 2018-08-29
Attending: INTERNAL MEDICINE
Payer: COMMERCIAL

## 2018-08-29 DIAGNOSIS — R92.8 ABNORMAL MAMMOGRAM: ICD-10-CM

## 2018-08-29 PROCEDURE — 76642 ULTRASOUND BREAST LIMITED: CPT

## 2018-08-29 PROCEDURE — 77065 DX MAMMO INCL CAD UNI: CPT

## 2019-02-18 ENCOUNTER — HOSPITAL ENCOUNTER (OUTPATIENT)
Dept: MAMMOGRAPHY | Age: 64
Discharge: HOME | End: 2019-02-18
Attending: INTERNAL MEDICINE
Payer: COMMERCIAL

## 2019-02-18 DIAGNOSIS — R92.8 FOLLOW-UP EXAMINATION OF ABNORMAL MAMMOGRAM: ICD-10-CM

## 2019-02-18 PROCEDURE — 77065 DX MAMMO INCL CAD UNI: CPT

## 2019-08-30 ENCOUNTER — HOSPITAL ENCOUNTER (OUTPATIENT)
Dept: MAMMOGRAPHY | Age: 64
Discharge: HOME | End: 2019-08-30
Attending: INTERNAL MEDICINE
Payer: COMMERCIAL

## 2019-08-30 DIAGNOSIS — Z12.31 VISIT FOR SCREENING MAMMOGRAM: ICD-10-CM

## 2019-08-30 PROCEDURE — 77067 SCR MAMMO BI INCL CAD: CPT

## 2020-10-16 ENCOUNTER — HOSPITAL ENCOUNTER (OUTPATIENT)
Dept: MAMMOGRAPHY | Age: 65
Discharge: HOME | End: 2020-10-16
Attending: INTERNAL MEDICINE
Payer: COMMERCIAL

## 2020-10-16 DIAGNOSIS — Z12.31 VISIT FOR SCREENING MAMMOGRAM: ICD-10-CM

## 2020-10-16 PROCEDURE — 77067 SCR MAMMO BI INCL CAD: CPT

## 2022-03-18 PROBLEM — Z86.711 HISTORY OF PULMONARY EMBOLUS (PE): Status: ACTIVE | Noted: 2017-07-18

## 2022-03-19 PROBLEM — Z95.828 S/P IVC FILTER: Status: ACTIVE | Noted: 2017-07-18

## 2022-03-20 PROBLEM — Z91.89 DES EXPOSURE IN UTERO: Status: ACTIVE | Noted: 2017-07-27
